# Patient Record
Sex: FEMALE | Race: WHITE
[De-identification: names, ages, dates, MRNs, and addresses within clinical notes are randomized per-mention and may not be internally consistent; named-entity substitution may affect disease eponyms.]

---

## 2017-09-16 ENCOUNTER — HOSPITAL ENCOUNTER (EMERGENCY)
Dept: HOSPITAL 25 - ED | Age: 72
Discharge: HOME | End: 2017-09-16
Payer: MEDICARE

## 2017-09-16 VITALS — DIASTOLIC BLOOD PRESSURE: 60 MMHG | SYSTOLIC BLOOD PRESSURE: 180 MMHG

## 2017-09-16 DIAGNOSIS — R53.83: Primary | ICD-10-CM

## 2017-09-16 DIAGNOSIS — M25.50: ICD-10-CM

## 2017-09-16 LAB
ALBUMIN SERPL BCG-MCNC: 4.2 G/DL (ref 3.2–5.2)
ALP SERPL-CCNC: 86 U/L (ref 34–104)
ALT SERPL W P-5'-P-CCNC: 28 U/L (ref 7–52)
ANION GAP SERPL CALC-SCNC: 6 MMOL/L (ref 2–11)
AST SERPL-CCNC: 21 U/L (ref 13–39)
BUN SERPL-MCNC: 29 MG/DL (ref 6–24)
BUN/CREAT SERPL: 34.5 (ref 8–20)
CALCIUM SERPL-MCNC: 9.5 MG/DL (ref 8.6–10.3)
CHLORIDE SERPL-SCNC: 104 MMOL/L (ref 101–111)
CK SERPL-CCNC: 87 U/L (ref 10–223)
GLOBULIN SER CALC-MCNC: 3.4 G/DL (ref 2–4)
GLUCOSE SERPL-MCNC: 132 MG/DL (ref 70–100)
HCO3 SERPL-SCNC: 25 MMOL/L (ref 22–32)
HCT VFR BLD AUTO: 41 % (ref 35–47)
HGB BLD-MCNC: 14 G/DL (ref 12–16)
MCH RBC QN AUTO: 31 PG (ref 27–31)
MCHC RBC AUTO-ENTMCNC: 34 G/DL (ref 31–36)
MCV RBC AUTO: 90 FL (ref 80–97)
POTASSIUM SERPL-SCNC: 3.9 MMOL/L (ref 3.5–5)
PROT SERPL-MCNC: 7.6 G/DL (ref 6.4–8.9)
RBC # BLD AUTO: 4.58 10^6/UL (ref 4–5.4)
SODIUM SERPL-SCNC: 135 MMOL/L (ref 133–145)
WBC # BLD AUTO: 11 10^3/UL (ref 3.5–10.8)

## 2017-09-16 PROCEDURE — 80053 COMPREHEN METABOLIC PANEL: CPT

## 2017-09-16 PROCEDURE — 99282 EMERGENCY DEPT VISIT SF MDM: CPT

## 2017-09-16 PROCEDURE — 85025 COMPLETE CBC W/AUTO DIFF WBC: CPT

## 2017-09-16 PROCEDURE — 36415 COLL VENOUS BLD VENIPUNCTURE: CPT

## 2017-09-16 PROCEDURE — 82550 ASSAY OF CK (CPK): CPT

## 2017-09-16 NOTE — ED
Complex/Multi-Sys Presentation





- HPI Summary


HPI Summary: 





Patient presents to the ED with multiple complaints.  She initiates the 

conversation with comments of how she needs to move out of her apartment 

complex and she has not been sleeping well d/t her loud neighbors.  She then 

discusses intermittent feeling of tingling sensation in her bilateral arms and 

legs which began several weeks ago without known injury.  She lifted a large 

box recently, but denied any back pain at that time.  She endorses some neck 

pain, but contributes this to not sleeping well and carrying extra weight.  She 

states she is 130lbs overweight and is requesting pain management through the 

hospital system.  She believes she may have diabetes and is currently trying to 

get ahold of her Dr (Dr. Purcell) to evaluate for this and her high cholesterol. 

She has high BP on arrival and is requesting a medication change from her 

generic propanolol to the name brand medication.  Patient denies chest pain, SOB

, weakness, abdominal pain, urinary symptoms, or visual changes.  She states 

since she has been carrying extra weight she has felt fatigued more easily.  VS 

stable except for elevated BP on arrival at 204/90.  





- History Of Current Complaint


Chief Complaint: EDNeckComplaint


Time Seen by Provider: 09/16/17 15:56


Hx Obtained From: Patient


Onset/Duration: Sudden Onset


Timing: Constant


Severity Currently: Mild


Severity Initially: Mild


Associated Signs And Symptoms: Negative: Decreased Responsiveness, Confusion, 

Agitation





- Allergies/Home Medications


Allergies/Adverse Reactions: 


 Allergies











Allergy/AdvReac Type Severity Reaction Status Date / Time


 


Celecoxib [From Celebrex] Allergy  Unknown Verified 03/28/16 14:23





   Reaction  





   Details  


 


Cephalexin [From Keflex] Allergy  Unknown Verified 03/28/16 14:23





   Reaction  





   Details  


 


Cimetidine [From Tagamet HB] Allergy  Rash Verified 03/28/16 14:23


 


Erythromycin Allergy  VERY SICK Verified 03/28/16 14:23





   TO STOMACH  


 


Trazodone Allergy  Unknown Verified 03/28/16 14:23





   Reaction  





   Details  


 


Ciprofloxacin AdvReac  Joint Pain Verified 03/28/16 14:23














PMH/Surg Hx/FS Hx/Imm Hx


Previously Healthy: Yes


Endocrine/Hematology History: 


   Denies: Hx Anticoagulant Therapy


Cardiovascular History: Reports: Hx Hypertension


   Denies: Hx Pacemaker/ICD


Musculoskeletal History: 


   Denies: Hx Osteoporosis


Sensory History: 


   Denies: Hx Hearing Aid


Psychiatric History: 


   Denies: Hx Panic Disorder, Hx of Violent Episodes Against Others





- Cancer History


Cancer Type, Location and Year: BASAL CELL CARCINOMA- SHOULDER AREA-MANY YRS 

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


Hx Chemotherapy: No


Hx Radiation Therapy: No





- Surgical History


Surgery Procedure, Year, and Place: IMPACTED WISDON TEETH; CYST REMOVED FROM 

OVARY AREA; APPENDECTOMY; LAPROSCOPY & THEN TOTAL HYSTERECTOMY; TONSILS; 

ENDOMETRIOUSIS; PHILLIP GREAT TOENAILS REMOVED; BASAL CELL REMOVAL FROM SHOULDER &.

  FACIAL-NEAR NOSE; CATARACT; CARDIAC CATH W/O STENTS





- Immunization History


Date of Tetanus Vaccine: Unk


Date of Influenza Vaccine: 2012; avoids due to getting flu-like symptoms after 

vaccination


Hx Pertussis Vaccination: No


Immunizations Up to Date: Unable to Obtain/Confirm


Infectious Disease History: No


Infectious Disease History: 


   Denies: Traveled Outside the [place] in Last 30 Days





- Family History


Known Family History: Positive: None





- Social History


Occupation: Unemployed


Alcohol Use: None


Substance Use Type: Reports: None


Smoking Status (MU): Never Smoked Tobacco





Review of Systems


Positive: Fatigue.  Negative: Fever, Chills


Negative: Photophobia, Blurred Vision


ENT: Negative


Cardiovascular: Negative


Negative: Chest Pain


Respiratory: Negative


Negative: Shortness Of Breath, Cough


Negative: Abdominal Pain, Diarrhea, Nausea


Positive: no symptoms reported, see HPI


Positive: Arthralgia - neck tenderness after lifting heavy object


Neurological: Negative


Negative: Weakness, Paresthesia


Psychological: Normal


All Other Systems Reviewed And Are Negative: Yes





Physical Exam


Triage Information Reviewed: Yes


Vital Signs On Initial Exam: 


 Initial Vitals











Temp Pulse Resp BP Pulse Ox


 


 98.2 F   98   18   204/90   97 


 


 09/16/17 15:48  09/16/17 15:48  09/16/17 15:48  09/16/17 15:48  09/16/17 15:48











Vital Signs Reviewed: Yes


Appearance: Positive: Well-Appearing, Well-Nourished


Skin: Positive: Warm, Skin Color Reflects Adequate Perfusion


Head/Face: Positive: Normal Head/Face Inspection


Eyes: Positive: EOMI, BETZY, Conjunctiva Clear


Neck: Positive: Supple, No Lymphadenopathy


Respiratory/Lung Sounds: Positive: Clear to Auscultation, Breath Sounds Present


Cardiovascular: Positive: Normal, RRR, Pulses are Symmetrical in both Upper and 

Lower Extremities


Musculoskeletal: Positive: Strength/ROM Intact


Neurological: Positive: Speech Normal


Psychiatric: Positive: Normal





- Narinder Coma Scale


Coma Scale Total: 15





Diagnostics





- Vital Signs


 Vital Signs











  Temp Pulse Resp BP Pulse Ox


 


 09/16/17 15:48  98.2 F  98  18  204/90  97














- Laboratory


Lab Results: 


 Lab Results











  09/16/17 09/16/17 Range/Units





  16:40 16:40 


 


WBC  11.0 H   (3.5-10.8)  10^3/ul


 


RBC  4.58   (4.0-5.4)  10^6/ul


 


Hgb  14.0   (12.0-16.0)  g/dl


 


Hct  41   (35-47)  %


 


MCV  90   (80-97)  fL


 


MCH  31   (27-31)  pg


 


MCHC  34   (31-36)  g/dl


 


RDW  14   (10.5-15)  %


 


Plt Count  226   (150-450)  10^3/ul


 


MPV  9   (7.4-10.4)  um3


 


Neut % (Auto)  59.9   (38-83)  %


 


Lymph % (Auto)  29.4   (25-47)  %


 


Mono % (Auto)  8.6   (1-9)  %


 


Eos % (Auto)  1.3   (0-6)  %


 


Baso % (Auto)  0.8   (0-2)  %


 


Absolute Neuts (auto)  6.6   (1.5-7.7)  10^3/ul


 


Absolute Lymphs (auto)  3.2   (1.0-4.8)  10^3/ul


 


Absolute Monos (auto)  0.9 H   (0-0.8)  10^3/ul


 


Absolute Eos (auto)  0.1   (0-0.6)  10^3/ul


 


Absolute Basos (auto)  0.1   (0-0.2)  10^3/ul


 


Absolute Nucleated RBC  0   10^3/ul


 


Nucleated RBC %  0   


 


Sodium   135  (133-145)  mmol/L


 


Potassium   3.9  (3.5-5.0)  mmol/L


 


Chloride   104  (101-111)  mmol/L


 


Carbon Dioxide   25  (22-32)  mmol/L


 


Anion Gap   6  (2-11)  mmol/L


 


BUN   29 H  (6-24)  mg/dL


 


Creatinine   0.84  (0.51-0.95)  mg/dL


 


Est GFR ( Amer)   86.2  (>60)  


 


Est GFR (Non-Af Amer)   67.0  (>60)  


 


BUN/Creatinine Ratio   34.5 H  (8-20)  


 


Glucose   132 H  ()  mg/dL


 


Calcium   9.5  (8.6-10.3)  mg/dL


 


Total Bilirubin   0.30  (0.2-1.0)  mg/dL


 


AST   21  (13-39)  U/L


 


ALT   28  (7-52)  U/L


 


Alkaline Phosphatase   86  ()  U/L


 


Total Creatine Kinase   87  ()  U/L


 


Total Protein   7.6  (6.4-8.9)  g/dL


 


Albumin   4.2  (3.2-5.2)  g/dL


 


Globulin   3.4  (2-4)  g/dL


 


Albumin/Globulin Ratio   1.2  (1-3)  











Result Diagrams: 


 09/16/17 16:40





 09/16/17 16:40


Lab Statement: Any lab studies that have been ordered have been reviewed, and 

results considered in the medical decision making process.





Complex Multi-Symp Course/Dx


Course Of Treatment: Discussed at [facility] the issues for which she comes to the 

ED for.  She is encouraged to follow up with PCP as these issues should all be 

dealt with on an outpatient basis.  She is encouraged to follow up for possible 

BP medications and diabetes check up.  Nothing noted on blood work at this 

time.  She is given information for weight loss program through [employer] 

as requested.  On discharge, her BP is at 180/70 and there is no evidence of 

organ damage, so will defer at this time for adding of medications.  Patient is 

made aware and she is OK with plan to follow up with Dr. Purcell.





- Diagnoses


Differential Diagnoses/HQI/PQRI: Metabolic Abnormality, Other - insomnia, HTN


Provider Diagnoses: 


 Fatigue








Discharge





- Discharge Plan


Condition: Stable


Disposition: HOME


Patient Education Materials:  Chronic Hypertension (ED), Weight Management (ED)


Referrals: 


Chau Purcell MD [Primary Care Provider] - 


Additional Instructions: 


Follow up with Dr. Purcell as discussed for hypertension





Try relaxation techniques and speak with your .





Try to rest and recover from your episodes of not sleeping well

## 2018-05-19 ENCOUNTER — HOSPITAL ENCOUNTER (EMERGENCY)
Dept: HOSPITAL 25 - ED | Age: 73
Discharge: HOME | End: 2018-05-19
Payer: MEDICARE

## 2018-05-19 VITALS — SYSTOLIC BLOOD PRESSURE: 189 MMHG | DIASTOLIC BLOOD PRESSURE: 97 MMHG

## 2018-05-19 DIAGNOSIS — K02.9: Primary | ICD-10-CM

## 2018-05-19 DIAGNOSIS — Z88.8: ICD-10-CM

## 2018-05-19 DIAGNOSIS — Z88.3: ICD-10-CM

## 2018-05-19 PROCEDURE — 99282 EMERGENCY DEPT VISIT SF MDM: CPT

## 2019-02-17 ENCOUNTER — HOSPITAL ENCOUNTER (EMERGENCY)
Dept: HOSPITAL 25 - ED | Age: 74
Discharge: ANOTHER HOSPITAL | End: 2019-02-17
Payer: MEDICARE

## 2019-02-17 VITALS — SYSTOLIC BLOOD PRESSURE: 202 MMHG | DIASTOLIC BLOOD PRESSURE: 95 MMHG

## 2019-02-17 DIAGNOSIS — E11.65: ICD-10-CM

## 2019-02-17 DIAGNOSIS — Z79.82: ICD-10-CM

## 2019-02-17 DIAGNOSIS — I10: ICD-10-CM

## 2019-02-17 DIAGNOSIS — K92.2: Primary | ICD-10-CM

## 2019-02-17 DIAGNOSIS — R21: ICD-10-CM

## 2019-02-17 DIAGNOSIS — K76.0: ICD-10-CM

## 2019-02-17 DIAGNOSIS — Z85.820: ICD-10-CM

## 2019-02-17 LAB
ALBUMIN SERPL BCG-MCNC: 4.1 G/DL (ref 3.2–5.2)
ALBUMIN/GLOB SERPL: 1.3 {RATIO} (ref 1–3)
ALP SERPL-CCNC: 83 U/L (ref 34–104)
ALT SERPL W P-5'-P-CCNC: 36 U/L (ref 7–52)
ANION GAP SERPL CALC-SCNC: 8 MMOL/L (ref 2–11)
APTT PPP: 29.1 SECONDS (ref 26–36.3)
AST SERPL-CCNC: 24 U/L (ref 13–39)
BASOPHILS # BLD AUTO: 0.1 10^3/UL (ref 0–0.2)
BUN SERPL-MCNC: 25 MG/DL (ref 6–24)
BUN/CREAT SERPL: 34.2 (ref 8–20)
CALCIUM SERPL-MCNC: 9.6 MG/DL (ref 8.6–10.3)
CHLORIDE SERPL-SCNC: 103 MMOL/L (ref 101–111)
EOSINOPHIL # BLD AUTO: 0.1 10^3/UL (ref 0–0.6)
GLOBULIN SER CALC-MCNC: 3.2 G/DL (ref 2–4)
GLUCOSE SERPL-MCNC: 178 MG/DL (ref 70–100)
HCO3 SERPL-SCNC: 25 MMOL/L (ref 22–32)
HCT VFR BLD AUTO: 42 % (ref 35–47)
HGB BLD-MCNC: 14.1 G/DL (ref 12–16)
INR PPP/BLD: 0.94 (ref 0.77–1.02)
LYMPHOCYTES # BLD AUTO: 2.1 10^3/UL (ref 1–4.8)
MCH RBC QN AUTO: 30 PG (ref 27–31)
MCHC RBC AUTO-ENTMCNC: 34 G/DL (ref 31–36)
MCV RBC AUTO: 90 FL (ref 80–97)
MONOCYTES # BLD AUTO: 0.9 10^3/UL (ref 0–0.8)
NEUTROPHILS # BLD AUTO: 5 10^3/UL (ref 1.5–7.7)
NRBC # BLD AUTO: 0 10^3/UL
NRBC BLD QL AUTO: 0
PLATELET # BLD AUTO: 203 10^3/UL (ref 150–450)
POTASSIUM SERPL-SCNC: 4 MMOL/L (ref 3.5–5)
PROT SERPL-MCNC: 7.3 G/DL (ref 6.4–8.9)
RBC # BLD AUTO: 4.65 10^6/UL (ref 4–5.4)
SODIUM SERPL-SCNC: 136 MMOL/L (ref 135–145)
TROPONIN I SERPL-MCNC: 0 NG/ML (ref ?–0.04)
WBC # BLD AUTO: 8.1 10^3/UL (ref 3.5–10.8)

## 2019-02-17 PROCEDURE — 84484 ASSAY OF TROPONIN QUANT: CPT

## 2019-02-17 PROCEDURE — 83605 ASSAY OF LACTIC ACID: CPT

## 2019-02-17 PROCEDURE — 96360 HYDRATION IV INFUSION INIT: CPT

## 2019-02-17 PROCEDURE — 93005 ELECTROCARDIOGRAM TRACING: CPT

## 2019-02-17 PROCEDURE — 86900 BLOOD TYPING SEROLOGIC ABO: CPT

## 2019-02-17 PROCEDURE — 82272 OCCULT BLD FECES 1-3 TESTS: CPT

## 2019-02-17 PROCEDURE — 99284 EMERGENCY DEPT VISIT MOD MDM: CPT

## 2019-02-17 PROCEDURE — 71045 X-RAY EXAM CHEST 1 VIEW: CPT

## 2019-02-17 PROCEDURE — 80053 COMPREHEN METABOLIC PANEL: CPT

## 2019-02-17 PROCEDURE — 85730 THROMBOPLASTIN TIME PARTIAL: CPT

## 2019-02-17 PROCEDURE — 74177 CT ABD & PELVIS W/CONTRAST: CPT

## 2019-02-17 PROCEDURE — 36415 COLL VENOUS BLD VENIPUNCTURE: CPT

## 2019-02-17 PROCEDURE — 86901 BLOOD TYPING SEROLOGIC RH(D): CPT

## 2019-02-17 PROCEDURE — 86850 RBC ANTIBODY SCREEN: CPT

## 2019-02-17 PROCEDURE — 85025 COMPLETE CBC W/AUTO DIFF WBC: CPT

## 2019-02-17 PROCEDURE — 85610 PROTHROMBIN TIME: CPT

## 2019-02-17 PROCEDURE — 96361 HYDRATE IV INFUSION ADD-ON: CPT

## 2019-02-17 NOTE — ED
GI/ HPI





- HPI Summary


HPI Summary: 


73 year old F brought in by ambulance to [facility] with a chief complaint of bloody 

stools since 09:00 today. The patient rates the pain 0/10 in severity. Symptoms 

aggravated by nothing. Symptoms alleviated by nothing. Patient reports 

lightheadedness. She additionally complains of abdominal pain that waxes and 

wanes, rated 2/10. Patient notes "acid reflux" a few days ago. Patient denies 

fever, chest pain, nausea, vomiting. 





Patient was seen by her OBGYN 2 weeks ago for yeast infection. She was given 

antibiotic cream. Since then, patient still has irritation around clitoris. She 

was going to make an appointment tomorrow but noticed 5-cm blood clot in bowel 

movement this morning. 





Patient has hx erosive esophagitis dx upper endoscopy, Dr. Banks in 2013.  Pt 

is not currently on PPI or H-2 blocker. Patient's abdominal surgeries include 

appendectomy, total hysterectomy. She thinks she had a colonoscopy but can't 

remember when. Colonoscopy encounter not in [facility] records. Patient does not have 

Fhx colon CA.





Vital signs while in room: HR 77 bpm, /111.





 Home Medications











 Medication  Instructions  Recorded  Confirmed  Type


 


Aspirin [Adult Aspirin] 81 mg PO DAILY 02/17/19 02/17/19 History


 


Betamethasone Malini 0.1% CM(NF) 1 applic TOPICAL DAILY 02/17/19 02/17/19 History





[Valisone 0.1% CM(NF)]    


 


Biotin 5,000 mcg PO BID 02/17/19 02/17/19 History


 


Calcium Carbonate/Vitamin D3 1 cap PO DAILY 02/17/19 02/17/19 History





[Calcium/Vitamin D]    


 


Estradiol VAGINAL TAB(NF) [Vagifem] 10 mcg VAGINAL SEE INSTRUCTIONS 02/17/19 02/ 17/19 History


 


Ferrous Sulfate [Iron High-Potency] 325 mg PO EVERY OTHER DAY 02/17/19 02/17/19 

History


 


Flaxseed 340 gm PO DAILY 02/17/19 02/17/19 History


 


Labetalol HCl 100 mg PO DAILY 02/17/19 02/17/19 History


 


Lidocaine PATCH 5%* [Lidoderm 5% 1 patch TRANSDERM DAILY PRN 02/17/19 02/17/19 

History





Patch*]    


 


Magnesium Oxide [Magnesium] 500 mg PO DAILY 02/17/19 02/17/19 History


 


Melatonin/Pyridoxine HCl (B6) 1 tab PO BEDTIME 02/17/19 02/17/19 History





[Melatonin]    


 


Miconazole VAG SUPP* 200 mg VAGINAL BEDTIME PRN 02/17/19 02/17/19 History


 


Niacin 500 mg PO DAILY 02/17/19 02/17/19 History


 


Selenium 200 mcg PO DAILY 02/17/19 02/17/19 History


 


Triamcinolone PASTE 0.1% (NF) 1 applic TOPICAL DAILY 02/17/19 02/17/19 History





[Triamcinolone 0.1% PASTE *]    


 


Zinc 50 mg PO DAILY 02/17/19 02/17/19 History


 


diazePAM [Diazepam] 5 mg PO DAILY PRN 02/17/19 02/17/19 History


 


diphenhydrAMINE HCl 25 mg PO DAILY PRN 02/17/19 02/17/19 History





[Diphenhydramine HCl]    














- History of Current Complaint


Chief Complaint: EDGIBleed


Time Seen by Provider: 02/17/19 11:51


Stated Complaint: BLOODY STOOL


Hx Obtained From: Patient, EMS


Onset/Duration: Started Hours Ago - 09:00 today, Still Present


Timing: Constant


Severity: Moderate


Current Severity: None - no abd pain at initial encounter


Pain Intensity: 0


Pain Characteristics: Dull


Associated Signs and Symptoms: Positive: Negative - fever, chest pain, nausea, 

vomiting, Blood w/Stool - jacqueline stool, Other: - lightheadedness, abdominal 

pain, "acid reflux"


Aggravating Factor(s): Nothing


Alleviating Factor(s): Nothing





- Allergy/Home Medications


Allergies/Adverse Reactions: 


 Allergies











Allergy/AdvReac Type Severity Reaction Status Date / Time


 


cimetidine Allergy  Unknown Verified 02/17/19 15:42





   Reaction  





   Details  


 


ciprofloxacin Allergy  Joint Pain Verified 02/17/19 15:42


 


erythromycin base Allergy  Rash Verified 02/17/19 15:42


 


trazodone Allergy  Unknown Verified 02/17/19 15:42





   Reaction  





   Details  











Home Medications: 


 Home Medications





Aspirin [Adult Aspirin] 81 mg PO DAILY 02/17/19 [History Confirmed 02/17/19]


Betamethasone Malini 0.1% CM(NF) [Valisone 0.1% CM(NF)] 1 applic TOPICAL DAILY 02/ 17/19 [History Confirmed 02/17/19]


Biotin 5,000 mcg PO BID 02/17/19 [History Confirmed 02/17/19]


Calcium Carbonate/Vitamin D3 [Calcium/Vitamin D] 1 cap PO DAILY 02/17/19 [

History Confirmed 02/17/19]


Estradiol VAGINAL TAB(NF) [Vagifem] 10 mcg VAGINAL SEE INSTRUCTIONS 02/17/19 [

History Confirmed 02/17/19]


Ferrous Sulfate [Iron High-Potency] 325 mg PO EVERY OTHER DAY 02/17/19 [History 

Confirmed 02/17/19]


Flaxseed 340 gm PO DAILY 02/17/19 [History Confirmed 02/17/19]


Labetalol HCl 100 mg PO DAILY 02/17/19 [History Confirmed 02/17/19]


Lidocaine PATCH 5%* [Lidoderm 5% Patch*] 1 patch TRANSDERM DAILY PRN 02/17/19 [

History Confirmed 02/17/19]


Magnesium Oxide [Magnesium] 500 mg PO DAILY 02/17/19 [History Confirmed 02/17/19

]


Melatonin/Pyridoxine HCl (B6) [Melatonin] 1 tab PO BEDTIME 02/17/19 [History 

Confirmed 02/17/19]


Miconazole VAG SUPP* 200 mg VAGINAL BEDTIME PRN 02/17/19 [History Confirmed 02/ 17/19]


Niacin 500 mg PO DAILY 02/17/19 [History Confirmed 02/17/19]


Selenium 200 mcg PO DAILY 02/17/19 [History Confirmed 02/17/19]


Triamcinolone PASTE 0.1% (NF) [Triamcinolone 0.1% PASTE *] 1 applic TOPICAL 

DAILY 02/17/19 [History Confirmed 02/17/19]


Zinc 50 mg PO DAILY 02/17/19 [History Confirmed 02/17/19]


diazePAM [Diazepam] 5 mg PO DAILY PRN 02/17/19 [History Confirmed 02/17/19]


diphenhydrAMINE HCl [Diphenhydramine HCl] 25 mg PO DAILY PRN 02/17/19 [History 

Confirmed 02/17/19]











PMH/Surg Hx/FS Hx/Imm Hx


Previously Healthy: No


Endocrine/Hematology History: Reports: Hx Diabetes


   Denies: Hx Anticoagulant Therapy


Cardiovascular History: Reports: Hx Hypertension


   Denies: Hx Pacemaker/ICD


GI History: Reports: Other GI Disorders - erosive esophagitis 


 History: Reports: Other  Problems/Disorders - recent treatment for "

vaginal yeast infection" No vaginal bleeding 


Musculoskeletal History: 


   Denies: Hx Osteoporosis


Sensory History: 


   Denies: Hx Hearing Aid


Psychiatric History: 


   Denies: Hx Panic Disorder, Hx of Violent Episodes Against Others





- Cancer History


Cancer Type, Location and Year: BASAL CELL CARCINOMA- SHOULDER AREA-MANY YRS 

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


Hx Chemotherapy: No


Hx Radiation Therapy: No





- Surgical History


Surgery Procedure, Year, and Place: IMPACTED WISDON TEETH; CYST REMOVED FROM 

OVARY AREA; APPENDECTOMY; LAPROSCOPY & THEN TOTAL HYSTERECTOMY; TONSILS; 

ENDOMETRIOSIS; PHILLIP GREAT TOENAILS REMOVED; BASAL CELL REMOVAL FROM SHOULDER &.  

FACIAL-NEAR NOSE; CATARACT; CARDIAC CATH W/O STENTS





- Immunization History


Date of Tetanus Vaccine: Unk


Date of Influenza Vaccine: 2012; avoids due to getting flu-like symptoms after 

vaccination


Infectious Disease History: No


Infectious Disease History: 


   Denies: Traveled Outside the [place] in Last 30 Days





- Family History


Known Family History: Positive: Cardiac Disease


   Negative: Diabetes





- Social History


Lives: Alone


Alcohol Use: None


Hx Substance Use: No


Substance Use Type: Reports: None


Hx Tobacco Use: No


Smoking Status (MU): Never Smoked Tobacco





Review of Systems


Negative: Fever


Negative: Chest Pain


Respiratory: Negative


Positive: Abdominal Pain, Other - acid reflux.  Negative: Vomiting, Nausea


Positive: no symptoms reported.  Negative: discharge


Musculoskeletal: Negative


Positive: Rash - left medial thigh 


Neurological: Other - lightheadedness


Psychological: Normal


All Other Systems Reviewed And Are Negative: Yes





Physical Exam





- Summary


Physical Exam Summary: 


Appearance: Ill-appearing, moderate pain distress, obese 


Skin: 10-cm x 12-cm rash on medial thigh that is red, has raised edges and 

central clearing 


Head: Normal Head/Face inspection, atraumatic


Eyes: Conjunctiva clear


ENT: Normal inspection


Neck: Supple, no nodes, no JVD


Respiratory: Lungs clear, normal breath sounds, no respiratory distress


Cardio: RRR, No murmur, pulses normal, brisk capillary refill


Abdomen: Soft, nontender. Patient has right low quadrant scar from pubis to 

umbilicus and a midline scar from umbilicus to pubis.


Bowel sounds: Present 


Musculoskeletal: Strength Intact/ROM intact, no calf tenderness, no edema.


Psychological: Normal


Neuro: Alert, muscle tone normal, no focal deficit


Rectal exam chaperoned by nurse Quintanilla: Patient has small external hemorrhoids 

that are non-thrombosed and not bleeding.  I feel some soft stool. There are no 

masses. This is a non-tender exam. Patient has jacqueline stool.


Triage Information Reviewed: Yes


Vital Signs On Initial Exam: 


 Initial Vitals











Temp Pulse Resp BP Pulse Ox


 


 98.3 F   87   18   220/111   98 


 


 02/17/19 11:47  02/17/19 11:47  02/17/19 11:47  02/17/19 11:47  02/17/19 11:47











Vital Signs Reviewed: Yes





Diagnostics





- Vital Signs


 Vital Signs











  Temp Pulse Resp BP Pulse Ox


 


 02/17/19 11:47  98.3 F  87  18  220/111  98














- Laboratory


Result Diagrams: 


 02/17/19 12:15





 02/17/19 12:15


Lab Statement: Any lab studies that have been ordered have been reviewed, and 

results considered in the medical decision making process.





- Radiology


  ** CXR


Radiology Interpretation Completed By: Radiologist


Summary of Radiographic Findings: NO ACTIVE CARDIOPULMONARY DISEASE. ED 

physician has reviewed this report.





- CT


  ** Abd/Pel


CT Interpretation Completed By: Radiologist


Summary of CT Findings: FATTY INFILTRATION OF THE LIVER. NO ACUTE CT PATHOLOGY 

OF THE VISUALIZED ABDOMEN OR PELVIS. ED physician has reviewed this report.





- EKG


  ** 1223


Cardiac Rate: NL - 74 BPM


EKG Rhythm: Sinus Rhythm


ST Segment: Non-Specific


Ectopy: None


EKG Comparison: No Significant Change - Compared to 11/17/16


Summary of EKG Findings: Nml AV/IV CT, nml QTc, and left axis. No acute changes





Re-Evaluation





- Re-Evaluation


  ** First Eval


Re-Evaluation Time: 13:04


Change: Unchanged


Comment: Discussed transfer plan with patient who is agreeable. HR 73 BPM, BP 

166/101





GIGU Course/Dx





- Course


Course Of Treatment: Reviewed nurses note. Patient medications reviewed this 

visit. Allergies noted. High blood pressure noted. Bloodwork was remarkable for 

glucose 178. Patient's initial H/H is 14.1/42. Stool sample is positive for 

blood in stool. CXR shows NO ACTIVE CARDIOPULMONARY DISEASE. CT Abd/Pel shows 

FATTY INFILTRATION OF THE LIVER. NO ACUTE CT PATHOLOGY OF THE VISUALIZED 

ABDOMEN OR PELVIS. Patient was given IV fluids in ED course. Spoke with Dr. Ruiz

, hospitalist, who recommends transferring patient due to lack of GI coverage 

today. Transfer initiated at 13:56. At 14:04, spoke with Dr. Leos, ED 

physician, at [facility] in [place], [place], who referred us to Dr. Joe Leger, 

hospitalist, at [facility], who agreed to accept patient at 14:14. Spoke with 

Aaliyah from [facility] at 14:15 who will call back with a bed 

assignment for patient. Spoke with Avelino from [facility] at 14:17 who 

is aware of the situation. At 15:18, [facility] returned call 

without a bed assignment. Spoke with Alize from [facility] at 15:32, 

who connected us to [facility] who said she did not have an 

estimate of when bed availibilty would be ready. At 15:50, [facility] 

[facility] was called. Spoke with Bridget at 15:54 who said that it might be tough 

to get a bed at [facility] but said she would speak to her nursing supervisor, 

Rashida. At 15:59, we are waiting for someone to call us back with a bed 

assignment. At 16:15, [facility] called us back with a bed 

assignment. Patient was accepted to [facility], Room 406. Patient will be 

transferred to [facility] in [place], [place]. Patient is agreeable to transfer.





- Diagnoses


Differential Diagnoses - Female: Colitis, Diverticulosis, Enterocolitis, 

Esophagitis/Gastritis, Ischemic Bowel


Provider Diagnoses: 


 GI bleed, Hypertension, poor control, Rash, Hyperglycemia








- Physician Notifications


Discussed Care Of Patient With: Herson Ruiz


Time Discussed With Above Provider: 13:02


Instructed by Provider To: Transfer - Dr. Ruiz, hospitalist, recommends 

transferring the patient to a facility that has GI coverage.


Reason For Transfer: Specialty available at [facility] but not on call.





- Critical Care Time


Critical Care Time: 30-74 min - 30 mins





Discharge





- Sign-Out/Discharge


Documenting (check all that apply): Patient Departure - Transfer


Patient Received Moderate/Deep Sedation with Procedure: No





- Discharge Plan


Condition: Stable


Disposition: TRANS HIGHER LVL OF CARE FAC


Referrals: 


Chau Purcell MD [Primary Care Provider] - 


Additional Instructions: 


Return to the emergency department for new or worsening symptoms





- Billing Disposition and Condition


Condition: STABLE


Disposition: Trans Higher Lvl of Care Fac





- Attestation Statements


Document Initiated by Scribe: Yes


Documenting Scribe: Haylie Schulte


Provider For Whom Scribe is Documenting (Include Credential): Tammy Boswell MD


Scribe Attestation: 


Haylie TORRES, scribed for Tammy Boswell MD on 02/17/19 at 1634. 


Scribe Documentation Reviewed: Yes


Provider Attestation: 


The documentation as recorded by the scribe, Haylie Schulte accurately reflects 

the service I personally performed and the decisions made by me, Tammy Boswell MD


Status of Scribe Document: Viewed

## 2019-02-25 ENCOUNTER — HOSPITAL ENCOUNTER (EMERGENCY)
Dept: HOSPITAL 25 - ED | Age: 74
Discharge: HOME | End: 2019-02-25
Payer: MEDICARE

## 2019-02-25 VITALS — SYSTOLIC BLOOD PRESSURE: 145 MMHG | DIASTOLIC BLOOD PRESSURE: 92 MMHG

## 2019-02-25 DIAGNOSIS — N39.0: Primary | ICD-10-CM

## 2019-02-25 DIAGNOSIS — R31.9: ICD-10-CM

## 2019-02-25 DIAGNOSIS — I10: ICD-10-CM

## 2019-02-25 DIAGNOSIS — E11.9: ICD-10-CM

## 2019-02-25 DIAGNOSIS — R10.9: ICD-10-CM

## 2019-02-25 LAB
ALBUMIN SERPL BCG-MCNC: 4.2 G/DL (ref 3.2–5.2)
ALBUMIN/GLOB SERPL: 1.4 {RATIO} (ref 1–3)
ALP SERPL-CCNC: 82 U/L (ref 34–104)
ALT SERPL W P-5'-P-CCNC: 36 U/L (ref 7–52)
ANION GAP SERPL CALC-SCNC: 8 MMOL/L (ref 2–11)
AST SERPL-CCNC: 24 U/L (ref 13–39)
BASOPHILS # BLD AUTO: 0.1 10^3/UL (ref 0–0.2)
BUN SERPL-MCNC: 20 MG/DL (ref 6–24)
BUN/CREAT SERPL: 27.8 (ref 8–20)
CALCIUM SERPL-MCNC: 9.3 MG/DL (ref 8.6–10.3)
CHLORIDE SERPL-SCNC: 104 MMOL/L (ref 101–111)
EOSINOPHIL # BLD AUTO: 0.1 10^3/UL (ref 0–0.6)
GLOBULIN SER CALC-MCNC: 3 G/DL (ref 2–4)
GLUCOSE SERPL-MCNC: 101 MG/DL (ref 70–100)
HCO3 SERPL-SCNC: 25 MMOL/L (ref 22–32)
HCT VFR BLD AUTO: 41 % (ref 35–47)
HGB BLD-MCNC: 14.1 G/DL (ref 12–16)
LYMPHOCYTES # BLD AUTO: 2.6 10^3/UL (ref 1–4.8)
MCH RBC QN AUTO: 31 PG (ref 27–31)
MCHC RBC AUTO-ENTMCNC: 34 G/DL (ref 31–36)
MCV RBC AUTO: 90 FL (ref 80–97)
MONOCYTES # BLD AUTO: 1 10^3/UL (ref 0–0.8)
NEUTROPHILS # BLD AUTO: 5.1 10^3/UL (ref 1.5–7.7)
NRBC # BLD AUTO: 0 10^3/UL
NRBC BLD QL AUTO: 0
PLATELET # BLD AUTO: 209 10^3/UL (ref 150–450)
POTASSIUM SERPL-SCNC: 4.3 MMOL/L (ref 3.5–5)
PROT SERPL-MCNC: 7.2 G/DL (ref 6.4–8.9)
RBC # BLD AUTO: 4.61 10^6/UL (ref 4–5.4)
RBC UR QL AUTO: (no result)
SODIUM SERPL-SCNC: 137 MMOL/L (ref 135–145)
WBC # BLD AUTO: 8.8 10^3/UL (ref 3.5–10.8)
WBC UR QL AUTO: (no result)

## 2019-02-25 PROCEDURE — 99283 EMERGENCY DEPT VISIT LOW MDM: CPT

## 2019-02-25 PROCEDURE — 80053 COMPREHEN METABOLIC PANEL: CPT

## 2019-02-25 PROCEDURE — 85025 COMPLETE CBC W/AUTO DIFF WBC: CPT

## 2019-02-25 PROCEDURE — 83690 ASSAY OF LIPASE: CPT

## 2019-02-25 PROCEDURE — 36415 COLL VENOUS BLD VENIPUNCTURE: CPT

## 2019-02-25 PROCEDURE — 87086 URINE CULTURE/COLONY COUNT: CPT

## 2019-02-25 PROCEDURE — 81003 URINALYSIS AUTO W/O SCOPE: CPT

## 2019-02-25 PROCEDURE — 81015 MICROSCOPIC EXAM OF URINE: CPT

## 2019-02-25 NOTE — ED
GI/ HPI





- HPI Summary


HPI Summary: 





Pt is a 72 y/o F presenting to the ED with a chief complaint of bleeding upon 

urination with associated RLQ pain. She states she has had an odor and itch for 

some time, which ended up being an infection that she treated with an Rx from 

her ob/gyn, but she thinks it may  be coming back. She has had scans done for 

her lower GI which turned out fine. Today, she saw bright red blood after 

urinating.





- History of Current Complaint


Chief Complaint: EDUrogenitalProblems


Time Seen by Provider: 02/25/19 12:47


Stated Complaint: GENERAL


Hx Obtained From: Patient


Onset/Duration: Started Days Ago, Still Present


Timing: Constant, Lasting Days


Severity: Moderate


Current Severity: Moderate


Vaginal Bleeding Description: Bright Red


Pain Intensity: 4


Location of Pain: RLQ


Pain Characteristics: Sharp


Associated Signs and Symptoms: Positive: Hematuria, Abdominal Pain, UTI Symptoms





- Allergy/Home Medications


Allergies/Adverse Reactions: 


 Allergies











Allergy/AdvReac Type Severity Reaction Status Date / Time


 


celecoxib Allergy  Hives Verified 02/25/19 12:22


 


cephalexin [From Keflex] Allergy  Unknown Verified 02/25/19 12:22





   Reaction  





   Details  


 


cimetidine Allergy  Unknown Verified 02/25/19 12:22





   Reaction  





   Details  


 


ciprofloxacin Allergy  Joint Pain Verified 02/25/19 12:22


 


erythromycin base Allergy  Rash Verified 02/25/19 12:22


 


hydroxyzine Allergy  Headache Verified 02/25/19 12:22


 


omeprazole Allergy  Unknown Verified 02/25/19 12:22





   Reaction  





   Details  


 


trazodone Allergy  Unknown Verified 02/25/19 12:22





   Reaction  





   Details  











Home Medications: 


 Home Medications





Ascorbic Acid/Collagen Hydr [Collagen Plus Vitamin C] 1 cap PO DAILY 02/25/19 [

History Confirmed 02/25/19]


Aspirin EC TAB* [Ecotrin EC Low Dose 81 MG*] 81 mg PO DAILY 02/25/19 [History 

Confirmed 02/25/19]


Calcium Carbonate [Calcium] 1,200 mg PO DAILY 02/25/19 [History Confirmed 02/25/ 19]


Chrm/Vineg/Bit-Orang Peel/Gr T [Apple Cider Vinegar Plus] 1 tab PO DAILY 02/25/ 19 [History Confirmed 02/25/19]


Diazepam TAB(*) [Valium TAB(*)] 5 mg PO BID PRN MDD 10 mg 02/25/19 [History 

Confirmed 02/25/19]


Estradiol VAGINAL TAB(NF) [Vagifem] 10 mcg VAGINAL DAILY 02/25/19 [History 

Confirmed 02/25/19]


Flaxseed [Flax Seeds] 2 pow PO DAILY 02/25/19 [History Confirmed 02/25/19]


Folic Acid 400 mcg PO DAILY 02/25/19 [History Confirmed 02/25/19]


Hydrocortisone SUPP* [Anusol HC Supp*] 25 mg IN BID PRN 02/25/19 [History 

Confirmed 02/25/19]


Labetalol TAB* [Trandate TAB*] 100 mg PO DAILY 02/25/19 [History Confirmed 02/25 /19]


Melatonin 5 mg PO BEDTIME 02/25/19 [History Confirmed 02/25/19]


Miconazole Nitrate 2 % VAGINAL DAILY 02/25/19 [History Confirmed 02/25/19]


Niacinamide [Niacin] 500 mg PO DAILY 02/25/19 [History Confirmed 02/25/19]


Omega-3 Fatty Acids (Nf) [Fish Oil (NF)] 1,000 mg PO DAILY 02/25/19 [History 

Confirmed 02/25/19]


Oregano Oil [Oil of Oregano] 3,000 mg PO DAILY 02/25/19 [History Confirmed 02/25 /19]


Sennosides/Docusate Sodium [Senokot S] 2 tab PO DAILY 02/25/19 [History 

Confirmed 02/25/19]


diPHENhydraMINE PO* [Benadryl PO 25 MG TAB*] 25 mg PO Q6H PRN 02/25/19 [History 

Confirmed 02/25/19]











PMH/Surg Hx/FS Hx/Imm Hx


Previously Healthy: Yes


Endocrine/Hematology History: Reports: Hx Diabetes


   Denies: Hx Anticoagulant Therapy


Cardiovascular History: Reports: Hx Hypertension


   Denies: Hx Pacemaker/ICD


GI History: Reports: Other GI Disorders - erosive esophagitis 


 History: Reports: Other  Problems/Disorders - recent treatment for "

vaginal yeast infection" No vaginal bleeding 


Musculoskeletal History: 


   Denies: Hx Osteoporosis


Sensory History: 


   Denies: Hx Hearing Aid


Psychiatric History: 


   Denies: Hx Panic Disorder, Hx of Violent Episodes Against Others





- Cancer History


Cancer Type, Location and Year: BASAL CELL CARCINOMA- SHOULDER AREA-MANY YRS 

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


Hx Chemotherapy: No


Hx Radiation Therapy: No





- Surgical History


Surgery Procedure, Year, and Place: IMPACTED WISDON TEETH; CYST REMOVED FROM 

OVARY AREA; APPENDECTOMY; LAPROSCOPY & THEN TOTAL HYSTERECTOMY; TONSILS; 

ENDOMETRIOSIS; PHILLIP GREAT TOENAILS REMOVED; BASAL CELL REMOVAL FROM SHOULDER &.  

FACIAL-NEAR NOSE; CATARACT; CARDIAC CATH W/O STENTS





- Immunization History


Date of Tetanus Vaccine: Unk


Date of Influenza Vaccine: 2012; avoids due to getting flu-like symptoms after 

vaccination


Infectious Disease History: No


Infectious Disease History: 


   Denies: Traveled Outside the [place] in Last 30 Days





- Family History


Known Family History: Positive: Cardiac Disease


   Negative: Diabetes





- Social History


Alcohol Use: None


Hx Substance Use: No


Substance Use Type: Reports: None


Hx Tobacco Use: No


Smoking Status (MU): Never Smoked Tobacco





Review of Systems


Negative: Fever


Positive: Abdominal Pain


Positive: hematuria, other - odor, itch


All Other Systems Reviewed And Are Negative: Yes





Physical Exam





- Summary


Physical Exam Summary: 





: Externally and internally there is no bleeding from the vaginal area.


Appearance: Well appearing, no pain distress


Skin: warm, dry, reflects adequate perfusion


Head/face: normal


Eyes: EOMI, BETZY


ENT: normal


Neck: supple, non-tender


Respiratory: CTA, breath sounds present


Cardiovascular: RRR, pulses symmetrical  


Abdomen: non-tender, soft


Musculoskeletal: normal, strength/ROM intact


Neuro: normal, sensory motor intact, A&Ox3 





Triage Information Reviewed: Yes


Vital Signs On Initial Exam: 


 Initial Vitals











Temp Pulse Resp BP Pulse Ox


 


 98.3 F   86   19   232/106   96 


 


 02/25/19 12:14  02/25/19 12:14  02/25/19 12:14  02/25/19 12:14  02/25/19 12:14











Vital Signs Reviewed: Yes





Diagnostics





- Vital Signs


 Vital Signs











  Temp Pulse Resp BP Pulse Ox


 


 02/25/19 12:14  98.3 F  86  19  232/106  96














- Laboratory


Result Diagrams: 


 02/25/19 13:22





 02/25/19 13:22


Lab Statement: Any lab studies that have been ordered have been reviewed, and 

results considered in the medical decision making process.





GIGU Course/Dx





- Course


Course Of Treatment: Pt is a 72 y/o F presenting to the ED with a chief 

complaint of bleeding upon urination with associated RLQ pain. She has had 

scans done for her lower GI which turned out fine. Today, she saw bright red 

blood after urinating. Per lab work, the pt has a UTI and will be discharged 

home. She is agreeable with this plan.





- Diagnoses


Differential Diagnoses - Female: Urinary Tract Infection


Provider Diagnoses: 


 UTI (urinary tract infection)








Discharge





- Sign-Out/Discharge


Documenting (check all that apply): Patient Departure


Patient Received Moderate/Deep Sedation with Procedure: No





- Discharge Plan


Condition: Stable


Disposition: HOME


Prescriptions: 


Clotrimazole 1% TOPICAL (NF) [Lotrimin 1% TOPICAL (NF)] 1 applic TOPICAL BID #2 

tube


Sulfamethox/Trimethoprim DS* [Bactrim /160 TAB*] 1 tab PO BID #10 tab


Referrals: 


Chau Purcell MD [Primary Care Provider] - 


Additional Instructions: 


Please follow up with your primary care provider within the next three days.





Return to the emergency department with any new or worsening symptoms.





- Billing Disposition and Condition


Condition: STABLE


Disposition: Home





- Attestation Statements


Document Initiated by Scribe: Yes


Documenting Scribe: Summer Hannon


Provider For Whom Scribe is Documenting (Include Credential): Thierno Colon MD.


Scribe Attestation: 


Summer TORRES, barbaraed for Thierno Colon MD. on 02/25/19 at 1433. 


Scribe Documentation Reviewed: Yes


Provider Attestation: 


The documentation as recorded by the Summer lopez accurately 

reflects the service I personally performed and the decisions made by Thierno santos MD.


Status of Scribe Document: Viewed

## 2019-04-28 ENCOUNTER — HOSPITAL ENCOUNTER (EMERGENCY)
Dept: HOSPITAL 25 - ED | Age: 74
Discharge: HOME | End: 2019-04-28
Payer: MEDICARE

## 2019-04-28 VITALS — SYSTOLIC BLOOD PRESSURE: 177 MMHG | DIASTOLIC BLOOD PRESSURE: 88 MMHG

## 2019-04-28 DIAGNOSIS — Z88.8: ICD-10-CM

## 2019-04-28 DIAGNOSIS — E11.9: ICD-10-CM

## 2019-04-28 DIAGNOSIS — I25.10: ICD-10-CM

## 2019-04-28 DIAGNOSIS — Z88.5: ICD-10-CM

## 2019-04-28 DIAGNOSIS — R42: Primary | ICD-10-CM

## 2019-04-28 DIAGNOSIS — I10: ICD-10-CM

## 2019-04-28 DIAGNOSIS — Z88.3: ICD-10-CM

## 2019-04-28 DIAGNOSIS — Z85.828: ICD-10-CM

## 2019-04-28 DIAGNOSIS — R55: ICD-10-CM

## 2019-04-28 LAB
ALBUMIN SERPL BCG-MCNC: 3.8 G/DL (ref 3.2–5.2)
ALBUMIN/GLOB SERPL: 1.4 {RATIO} (ref 1–3)
ALP SERPL-CCNC: 75 U/L (ref 34–104)
ALT SERPL W P-5'-P-CCNC: 44 U/L (ref 7–52)
ANION GAP SERPL CALC-SCNC: 8 MMOL/L (ref 2–11)
APTT PPP: 31 SECONDS (ref 26–36.3)
AST SERPL-CCNC: 28 U/L (ref 13–39)
BASOPHILS # BLD AUTO: 0.1 10^3/UL (ref 0–0.2)
BUN SERPL-MCNC: 19 MG/DL (ref 6–24)
BUN/CREAT SERPL: 28.4 (ref 8–20)
CALCIUM SERPL-MCNC: 8.9 MG/DL (ref 8.6–10.3)
CHLORIDE SERPL-SCNC: 105 MMOL/L (ref 101–111)
EOSINOPHIL # BLD AUTO: 0.2 10^3/UL (ref 0–0.6)
GLOBULIN SER CALC-MCNC: 2.8 G/DL (ref 2–4)
GLUCOSE SERPL-MCNC: 123 MG/DL (ref 70–100)
HCO3 SERPL-SCNC: 25 MMOL/L (ref 22–32)
HCT VFR BLD AUTO: 40 % (ref 33–41)
HGB BLD-MCNC: 13.8 G/DL (ref 12–16)
INR PPP/BLD: 1 (ref 0.82–1.09)
LYMPHOCYTES # BLD AUTO: 3.1 10^3/UL (ref 1–4.8)
MCH RBC QN AUTO: 31 PG (ref 27–31)
MCHC RBC AUTO-ENTMCNC: 35 G/DL (ref 31–36)
MCV RBC AUTO: 89 FL (ref 80–97)
MONOCYTES # BLD AUTO: 1.1 10^3/UL (ref 0–0.8)
NEUTROPHILS # BLD AUTO: 4.3 10^3/UL (ref 1.5–7.7)
NRBC # BLD AUTO: 0 10^3/UL
NRBC BLD QL AUTO: 0.1
PLATELET # BLD AUTO: 204 10^3/UL (ref 150–450)
POTASSIUM SERPL-SCNC: 3.9 MMOL/L (ref 3.5–5)
PROT SERPL-MCNC: 6.6 G/DL (ref 6.4–8.9)
RBC # BLD AUTO: 4.47 10^6 /UL (ref 3.7–4.87)
SODIUM SERPL-SCNC: 138 MMOL/L (ref 135–145)
TROPONIN I SERPL-MCNC: 0 NG/ML (ref ?–0.04)
WBC # BLD AUTO: 8.8 10^3/UL (ref 3.5–10.8)

## 2019-04-28 PROCEDURE — 84484 ASSAY OF TROPONIN QUANT: CPT

## 2019-04-28 PROCEDURE — 85610 PROTHROMBIN TIME: CPT

## 2019-04-28 PROCEDURE — 36415 COLL VENOUS BLD VENIPUNCTURE: CPT

## 2019-04-28 PROCEDURE — 70450 CT HEAD/BRAIN W/O DYE: CPT

## 2019-04-28 PROCEDURE — 99283 EMERGENCY DEPT VISIT LOW MDM: CPT

## 2019-04-28 PROCEDURE — 83605 ASSAY OF LACTIC ACID: CPT

## 2019-04-28 PROCEDURE — 80053 COMPREHEN METABOLIC PANEL: CPT

## 2019-04-28 PROCEDURE — 85730 THROMBOPLASTIN TIME PARTIAL: CPT

## 2019-04-28 PROCEDURE — 71045 X-RAY EXAM CHEST 1 VIEW: CPT

## 2019-04-28 PROCEDURE — 81003 URINALYSIS AUTO W/O SCOPE: CPT

## 2019-04-28 PROCEDURE — 93005 ELECTROCARDIOGRAM TRACING: CPT

## 2019-04-28 PROCEDURE — 85025 COMPLETE CBC W/AUTO DIFF WBC: CPT

## 2019-04-28 NOTE — ED
Neurological HPI





- HPI Summary


HPI Summary: 


This patient is a 74 year old F presenting to ED with a chief complaint of 

intermittent weakness since November 2018 after she got her tooth taken out. 

She reports her sx worsened this morning while walking to the bathroom where 

she had a near-syncopal episode. Patient describes the CC as legs started to 

fold up, starting to be pushed forward. Patient did not have head trauma and 

did not lose consciousness. Patient reports right abdominal pain described as 

cramping and dizziness. The patient reports that she lives alone. PMHx of DM, 

HTN, cardiac, disease, erosive esophagitis, basal cell carcinoma shoulder area, 

melanoma on face, chronic left jaw infection, hemorrhoids, appendicitis, and 

diverticulitis. PSHx of impacted wisdom teeth, cyst removed from ovary area, 

appendectomy, total hysterectomy, basal cell removal from shoulder and facial-

near nose, and cardiac cath w/o stents. She does not use alcohol, smoke tobacco

, or use substances.





- History of Current Complaint


Chief Complaint: EDWeakness


Stated Complaint: NEAR SYNCOPE PER EMS


Time Seen by Provider: 04/28/19 03:51


Hx Obtained From: Patient


Onset/Duration: Started weeks ago - November 2018, Still Present, Worse Since - 

This morning


Timing: Intermittent Episodes Lasting:


Current Severity: None


Pain Intensity: 0


Pain Scale Used: 0-10 Numeric


Character: Weak, Other: - near-syncope, denies head injury and LOC


Aggravating: Nothing


Alleviating: Nothing


Associated Signs and Symptoms: Positive: Weakness, Dizziness.  Negative: Loss 

of Consciousness, Trauma: Recent





- Allergy/Home Medications


Allergies/Adverse Reactions: 


 Allergies











Allergy/AdvReac Type Severity Reaction Status Date / Time


 


celecoxib Allergy  Hives Verified 02/25/19 12:22


 


cephalexin [From Keflex] Allergy  Unknown Verified 02/25/19 12:22





   Reaction  





   Details  


 


cimetidine Allergy  Unknown Verified 02/25/19 12:22





   Reaction  





   Details  


 


ciprofloxacin Allergy  Joint Pain Verified 02/25/19 12:22


 


erythromycin base Allergy  Rash Verified 02/25/19 12:22


 


hydroxyzine Allergy  Headache Verified 02/25/19 12:22


 


omeprazole Allergy  Unknown Verified 02/25/19 12:22





   Reaction  





   Details  


 


trazodone Allergy  Unknown Verified 02/25/19 12:22





   Reaction  





   Details  














PMH/Surg Hx/FS Hx/Imm Hx


Endocrine/Hematology History: Reports: Hx Diabetes


   Denies: Hx Anticoagulant Therapy


Cardiovascular History: Reports: Hx Coronary Artery Disease, Hx Hypertension


   Denies: Hx Pacemaker/ICD


GI History: Reports: Other GI Disorders - erosive esophagitis 


 History: Reports: Other  Problems/Disorders - recent treatment for "

vaginal yeast infection" No vaginal bleeding 


Musculoskeletal History: 


   Denies: Hx Osteoporosis


Sensory History: 


   Denies: Hx Hearing Aid


Psychiatric History: 


   Denies: Hx Panic Disorder, Hx of Violent Episodes Against Others





- Cancer History


Cancer Type, Location and Year: BASAL CELL CARCINOMA- SHOULDER AREA-MANY YRS 

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


Hx Chemotherapy: No


Hx Radiation Therapy: No





- Surgical History


Surgery Procedure, Year, and Place: IMPACTED WISDON TEETH; CYST REMOVED FROM 

OVARY AREA; APPENDECTOMY; LAPROSCOPY & THEN TOTAL HYSTERECTOMY; TONSILS; 

ENDOMETRIOSIS; PHILLIP GREAT TOENAILS REMOVED; BASAL CELL REMOVAL FROM SHOULDER &.  

FACIAL-NEAR NOSE; CATARACT; CARDIAC CATH W/O STENTS





- Immunization History


Date of Tetanus Vaccine: Unk


Date of Influenza Vaccine: 2012; avoids due to getting flu-like symptoms after 

vaccination


Infectious Disease History: No


Infectious Disease History: 


   Denies: Traveled Outside the [place] in Last 30 Days





- Family History


Known Family History: Positive: Cardiac Disease


   Negative: Diabetes





- Social History


Alcohol Use: None


Hx Substance Use: No


Substance Use Type: Reports: None


Hx Tobacco Use: No


Smoking Status (MU): Never Smoked Tobacco





Review of Systems


Positive: Abdominal Pain - cramping


Musculoskeletal: Other - No head trauma


Neurological: Other - near-syncope; denies LOC


Positive: Weakness - Legs started to fold up, falling forward


All Other Systems Reviewed And Are Negative: Yes





Physical Exam





- Summary


Physical Exam Summary: 


Appearance: Well appearing, no pain distress


Skin: warm, dry, reflects adequate perfusion


Head/face: normal


Eyes: EOMI, BETZY


ENT: normal


Neck: supple, non-tender


Respiratory: CTA, breath sounds present


Cardiovascular: RRR, pulses symmetrical 


Abdomen: non-tender, soft


Musculoskeletal: normal, strength/ROM intact


Neuro: normal, sensory motor intact, A&Ox3


GCS: 15


Triage Information Reviewed: Yes


Vital Signs On Initial Exam: 


 Initial Vitals











Pulse BP Pulse Ox


 


 76   203/94   97 


 


 04/28/19 03:54  04/28/19 03:54  04/28/19 03:54











Vital Signs Reviewed: Yes





Diagnostics





- Vital Signs


 Vital Signs











  Temp Pulse Resp BP Pulse Ox


 


 04/28/19 04:00  98.4 F  76  20  203/94  95


 


 04/28/19 03:55   76    97


 


 04/28/19 03:54   76   203/94  97














- Laboratory


Result Diagrams: 


 04/28/19 04:38





 04/28/19 04:38


Lab Statement: Any lab studies that have been ordered have been reviewed, and 

results considered in the medical decision making process.





- Radiology


  ** CXR


Radiology Interpretation Completed By: ED Physician


Summary of Radiographic Findings: No acute processes.  Pending official 

radiology interpretation.





- CT


  ** CT Brain


CT Interpretation Completed By: Radiologist


Summary of CT Findings: No acute intracranial findings.  Dr. Colon has 

reviewed this radiology report.





- EKG


  ** 0514


Cardiac Rate: NL - 64 BPM


EKG Rhythm: Sinus Rhythm


Summary of EKG Findings: No acute changes.





Course/Dx





- Course


Assessment/Plan: This patient is a 74 year old F presenting to ED with a chief 

complaint of intermittent weakness since November 2018 after she got her tooth 

taken out. Blood work and UA obtained. EKG reveals NSR at 64 BPM and no acute 

changes. CXR reveals no acute processes. CT brain reveals no acute intracranial 

findings. Patient will be discharged home with dx of dizziness and near 

syncope. Patient understands and agrees with this plan.





- Differential Dx


Differential Diagnoses Neuro: Positive: Dysrhythmia, Intracranial Bleed, 

Vasovagal Reaction, Other - Dizziness, near syncope





- Diagnoses


Provider Diagnoses: 


 Dizziness, Near syncope








Discharge





- Sign-Out/Discharge


Documenting (check all that apply): Patient Departure - Discharge


Patient Received Moderate/Deep Sedation with Procedure: No





- Discharge Plan


Condition: Stable


Disposition: HOME


Patient Education Materials:  Near Syncope (ED), Dizziness (ED)


Referrals: 


Chau Purcell MD [Primary Care Provider] - 3 Days


Additional Instructions: 


Follow up with your primary care provider in 3 days.


RETURN TO EMERGENCY ROOM IF CHANGING OR WORSENING SYMPTOMS.





- Billing Disposition and Condition


Condition: STABLE


Disposition: Home





- Attestation Statements


Document Initiated by Scribe: Yes


Documenting Scribe: Callum Camarena


Provider For Whom Chad is Documenting (Include Credential): Thierno Colon MD


Scribe Attestation: 


Callum TORRES, scribed for Thierno Colon MD on 04/28/19 at 0603. 


Scribe Documentation Reviewed: Yes


Provider Attestation: 


The documentation as recorded by the Callum lopez accurately reflects the 

service I personally performed and the decisions made by me, Thierno Colon MD


Status of Scribe Document: Viewed

## 2019-09-28 ENCOUNTER — HOSPITAL ENCOUNTER (EMERGENCY)
Dept: HOSPITAL 25 - ED | Age: 74
Discharge: HOME | End: 2019-09-28
Payer: MEDICARE

## 2019-09-28 VITALS — DIASTOLIC BLOOD PRESSURE: 91 MMHG | SYSTOLIC BLOOD PRESSURE: 175 MMHG

## 2019-09-28 DIAGNOSIS — I25.10: ICD-10-CM

## 2019-09-28 DIAGNOSIS — Z90.710: ICD-10-CM

## 2019-09-28 DIAGNOSIS — Z85.820: ICD-10-CM

## 2019-09-28 DIAGNOSIS — R10.9: Primary | ICD-10-CM

## 2019-09-28 DIAGNOSIS — Z79.899: ICD-10-CM

## 2019-09-28 DIAGNOSIS — Z88.8: ICD-10-CM

## 2019-09-28 DIAGNOSIS — Z85.89: ICD-10-CM

## 2019-09-28 DIAGNOSIS — I10: ICD-10-CM

## 2019-09-28 DIAGNOSIS — Z88.1: ICD-10-CM

## 2019-09-28 DIAGNOSIS — N28.1: ICD-10-CM

## 2019-09-28 DIAGNOSIS — E11.9: ICD-10-CM

## 2019-09-28 LAB
ALBUMIN SERPL BCG-MCNC: 4.2 G/DL (ref 3.2–5.2)
ALBUMIN/GLOB SERPL: 1.4 {RATIO} (ref 1–3)
ALP SERPL-CCNC: 93 U/L (ref 34–104)
ALT SERPL W P-5'-P-CCNC: 43 U/L (ref 7–52)
ANION GAP SERPL CALC-SCNC: 7 MMOL/L (ref 2–11)
AST SERPL-CCNC: 28 U/L (ref 13–39)
BASOPHILS # BLD AUTO: 0.1 10^3/UL (ref 0–0.2)
BUN SERPL-MCNC: 20 MG/DL (ref 6–24)
BUN/CREAT SERPL: 28.6 (ref 8–20)
CALCIUM SERPL-MCNC: 9.4 MG/DL (ref 8.6–10.3)
CHLORIDE SERPL-SCNC: 104 MMOL/L (ref 101–111)
EOSINOPHIL # BLD AUTO: 0.2 10^3/UL (ref 0–0.6)
GLOBULIN SER CALC-MCNC: 2.9 G/DL (ref 2–4)
GLUCOSE SERPL-MCNC: 102 MG/DL (ref 70–100)
HCO3 SERPL-SCNC: 25 MMOL/L (ref 22–32)
HCT VFR BLD AUTO: 41 % (ref 35–47)
HGB BLD-MCNC: 14.4 G/DL (ref 12–16)
LYMPHOCYTES # BLD AUTO: 3.1 10^3/UL (ref 1–4.8)
MCH RBC QN AUTO: 31 PG (ref 27–31)
MCHC RBC AUTO-ENTMCNC: 35 G/DL (ref 31–36)
MCV RBC AUTO: 90 FL (ref 80–97)
MONOCYTES # BLD AUTO: 1.1 10^3/UL (ref 0–0.8)
NEUTROPHILS # BLD AUTO: 5.9 10^3/UL (ref 1.5–7.7)
NRBC # BLD AUTO: 0 10^3/UL
NRBC BLD QL AUTO: 0
PLATELET # BLD AUTO: 179 10^3/UL (ref 150–450)
POTASSIUM SERPL-SCNC: 4 MMOL/L (ref 3.5–5)
PROT SERPL-MCNC: 7.1 G/DL (ref 6.4–8.9)
RBC # BLD AUTO: 4.59 10^6 /UL (ref 3.7–4.87)
SODIUM SERPL-SCNC: 136 MMOL/L (ref 135–145)
WBC # BLD AUTO: 10.4 10^3/UL (ref 3.5–10.8)

## 2019-09-28 PROCEDURE — 74177 CT ABD & PELVIS W/CONTRAST: CPT

## 2019-09-28 PROCEDURE — 99283 EMERGENCY DEPT VISIT LOW MDM: CPT

## 2019-09-28 PROCEDURE — 81003 URINALYSIS AUTO W/O SCOPE: CPT

## 2019-09-28 PROCEDURE — 36415 COLL VENOUS BLD VENIPUNCTURE: CPT

## 2019-09-28 PROCEDURE — 96375 TX/PRO/DX INJ NEW DRUG ADDON: CPT

## 2019-09-28 PROCEDURE — 85025 COMPLETE CBC W/AUTO DIFF WBC: CPT

## 2019-09-28 PROCEDURE — 83690 ASSAY OF LIPASE: CPT

## 2019-09-28 PROCEDURE — 96374 THER/PROPH/DIAG INJ IV PUSH: CPT

## 2019-09-28 PROCEDURE — 83605 ASSAY OF LACTIC ACID: CPT

## 2019-09-28 PROCEDURE — 96361 HYDRATE IV INFUSION ADD-ON: CPT

## 2019-09-28 PROCEDURE — 80307 DRUG TEST PRSMV CHEM ANLYZR: CPT

## 2019-09-28 PROCEDURE — 86140 C-REACTIVE PROTEIN: CPT

## 2019-09-28 PROCEDURE — 80053 COMPREHEN METABOLIC PANEL: CPT

## 2019-09-28 NOTE — ED
Progress





- Progress Note


Progress Note: 





This pt was signed out from Dr. Byrnes to Dr. Chao at shift change on 09/28/19 

at 0700 pending CT abdomen/pelvis.





CT abdomen/pelvis, as read by radiologist


IMPRESSION:


The CT exam does not have an anatomic explanation for the patient's right upper 

quadrant pain. No calcified stone within the gallbladder. No gallbladder wall 

thickening or pericholecystic fluid. No distension of the pancreatic duct. No 

focal lesion within the liver. The right kidney is smaller than the left kidney 

with some thinning of the renal cortex. This may be secondary to a prior 

inflammatory or infectious process. No hydronephrosis or nephrolithiasis. 

Complex cyst located in the right kidney in which the density is approximately 

54 Hounsfield units. This represents a Bosniak 2F cyst because of the density. 

Recommend ultrasound evaluation to determine whether this is cystic or solid. 

This was seen on the prior CT study of 02/17/2019 and has not changed in size 

or appearance. This could represent a hemorrhagic cyst.


Dr. Chao has reviewed this report.





Course/Dx





- Course


Course Of Treatment: Ms. Osborne was pending CT scan when I came in in the 

morning.  After CT she was evaluated and had some mild diffuse tenderness.  CT 

scan was unremarkable and I discussed likely reasons for returning to the 

emergency department.  I recommended follow-up with her PCP for further 

evaluation and gave her couple days of pain medication which she requested.





- Diagnoses


Provider Diagnoses: 


 Abdominal pain








Discharge ED





- Sign-Out/Discharge


Documenting (check all that apply): Patient Departure - Discharge home, 

Receiving Sign-Out


Receiving patient FROM: Rodney Byrnes


Patient Received Moderate/Deep Sedation with Procedure: No





- Discharge Plan


Condition: Stable


Disposition: HOME


Prescriptions: 


HYDROcodone/ACETAMIN 5-325 MG* [Norco 5-325 TAB*] 1 tab PO Q6H PRN #20 tab MDD 4


 PRN Reason: Pain


Patient Education Materials:  Abdominal Pain (ED)


Referrals: 


Chau Purcell MD [Primary Care Provider] - 


Additional Instructions: 


Follow up with your primary care provider, Dr. Purcell, in 2-3 days.





RETURN TO THE ED FOR ANY WORSENING OR NEW SYMPTOMS.





- Billing Disposition and Condition


Condition: STABLE


Disposition: Home





- Attestation Statements


Document Initiated by Scribe: Yes


Documenting Scribe: Kirstin Todd


Provider For Whom Scribe is Documenting (Include Credential): Rodney Chao MD


Scribe Attestation: 


I, Kirstin Todd, scribed for Rodney Chao MD on 09/28/19 at 1521. 


Scribe Documentation Reviewed: Yes


Provider Attestation: 


The documentation as recorded by the scribe, Kirstin Todd accurately reflects 

the service I personally performed and the decisions made by me, Rodney Chao MD


Status of Scribe Document: Viewed

## 2019-09-28 NOTE — ED
Abdominal Pain/Female





- HPI Summary


HPI Summary: 


This patient is a 74 year old female presenting to [facility] with a chief complaint 

of waxing lower abdominal pain. She states the right side hurts more than left. 

She states the pain radiates to her both flanks, the right side more than left. 

She rates her pain 10/10 in severity. She reports diarrhea. 





- History of Current Complaint


Chief Complaint: EDAbdPain


Stated Complaint: ABD PAIN PER PT


Time Seen by Provider: 09/28/19 04:57


Hx Obtained From: Patient


Pain Intensity: 10


Pain Scale Used: 0-10 Numeric


Location: Discrete At: RLQ, Discrete At: LLQ, Flank


Allergies/Adverse Reactions: 


 Allergies











Allergy/AdvReac Type Severity Reaction Status Date / Time


 


celecoxib Allergy  Hives Verified 09/28/19 04:25


 


cephalexin [From Keflex] Allergy  Unknown Verified 09/28/19 04:25





   Reaction  





   Details  


 


cimetidine Allergy  Unknown Verified 09/28/19 04:25





   Reaction  





   Details  


 


ciprofloxacin Allergy  Joint Pain Verified 09/28/19 04:25


 


erythromycin base Allergy  Rash Verified 09/28/19 04:25


 


hydroxyzine Allergy  Headache Verified 09/28/19 04:25


 


omeprazole Allergy  Unknown Verified 09/28/19 04:25





   Reaction  





   Details  


 


trazodone Allergy  Unknown Verified 09/28/19 04:25





   Reaction  





   Details  











Home Medications: 


 Home Medications





NIFEdipine [Nifedipine ER] 1 tab PO DAILY 09/28/19 [History Confirmed 09/28/19]











PMH/Surg Hx/FS Hx/Imm Hx


Endocrine/Hematology History: Reports: Hx Diabetes


   Denies: Hx Anticoagulant Therapy


Cardiovascular History: Reports: Hx Coronary Artery Disease, Hx Hypertension


   Denies: Hx Pacemaker/ICD


GI History: Reports: Other GI Disorders - erosive esophagitis 


 History: Reports: Other  Problems/Disorders - recent treatment for "

vaginal yeast infection" No vaginal bleeding 


Musculoskeletal History: 


   Denies: Hx Osteoporosis


Sensory History: 


   Denies: Hx Hearing Aid


Psychiatric History: 


   Denies: Hx Panic Disorder, Hx of Violent Episodes Against Others





- Cancer History


Cancer Type, Location and Year: BASAL CELL CARCINOMA- SHOULDER AREA-MANY YRS 

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


Hx Chemotherapy: No


Hx Radiation Therapy: No





- Surgical History


Surgery Procedure, Year, and Place: IMPACTED WISDON TEETH; CYST REMOVED FROM 

OVARY AREA; APPENDECTOMY; LAPROSCOPY & THEN TOTAL HYSTERECTOMY; TONSILS; 

ENDOMETRIOSIS; PHILLIP GREAT TOENAILS REMOVED; BASAL CELL REMOVAL FROM SHOULDER &.  

FACIAL-NEAR NOSE; CATARACT; CARDIAC CATH W/O STENTS





- Immunization History


Date of Tetanus Vaccine: Unk


Date of Influenza Vaccine: 2012; avoids due to getting flu-like symptoms after 

vaccination


Infectious Disease History: No


Infectious Disease History: 


   Denies: Traveled Outside the [place] in Last 30 Days





- Family History


Known Family History: Positive: Cardiac Disease


   Negative: Diabetes





- Social History


Alcohol Use: None


Hx Substance Use: No


Substance Use Type: Reports: None


Hx Tobacco Use: No


Smoking Status (MU): Never Smoked Tobacco





Review of Systems


Positive: Abdominal Pain, Diarrhea


Positive: flank pain


All Other Systems Reviewed And Are Negative: Yes





Physical Exam





- Summary


Physical Exam Summary: 





Appearance: Well-appearing, Well-nourished, lying in bed comfortably


Skin: Warm, dry, no obvious rash


Eyes: sclera anicteric, no conjunctival pallor


ENT: mucous membranes moist, pharynx appears normal


Neck: Supple, nontender


Respiratory: Clear to auscultation, no signs of respiratory distress


Cardiovascular: Normal S1, S2. No murmurs. Normal distal pulses in tibial and 

radial bilaterally.


Abdomen: Soft, nontender, normal active bowel sounds present


Musculoskeletal: Normal, Strength/ROM Intact


Neurological: A&Ox3, awake and alert, mentation is normal, speech is fluent and 

appropriate


Psychiatric: affect is normal, does not appear anxious or depressed





Triage Information Reviewed: Yes


Vital Signs On Initial Exam: 


 Initial Vitals











Temp Pulse Resp BP Pulse Ox


 


 98.1 F   85   16   226/120   97 


 


 09/28/19 04:19  09/28/19 04:19  09/28/19 04:19  09/28/19 04:19  09/28/19 04:19











Vital Signs Reviewed: Yes





Procedures





- Sedation


Patient Received Moderate/Deep Sedation with Procedure: No





Diagnostics





- Vital Signs


 Vital Signs











  Temp Pulse Resp BP Pulse Ox


 


 09/28/19 04:19  98.1 F  85  16  226/120  97














- Laboratory


Result Diagrams: 


 09/28/19 05:12





 09/28/19 05:12


Lab Statement: Any lab studies that have been ordered have been reviewed, and 

results considered in the medical decision making process.





Abdominal Pain Fem Course/Dx





- Course


Course Of Treatment: This patient is a 74 year old female presenting to [facility] 

with a chief complaint of lower abdominal pain. The patient will be signed out 

to Dr. Chao at 0700 pending CT Abd/Pelvis reading.





- Diagnoses


Provider Diagnoses: 


 Abdominal pain








Discharge ED





- Sign-Out/Discharge


Documenting (check all that apply): Sign-Out Patient


Signing out patient TO: Rodney Chao





- Discharge Plan


Condition: Stable


Disposition: HOME


Prescriptions: 


HYDROcodone/ACETAMIN 5-325 MG* [Norco 5-325 TAB*] 1 tab PO Q6H PRN #20 tab MDD 4


 PRN Reason: Pain


Patient Education Materials:  Abdominal Pain (ED)


Referrals: 


Chau Purcell MD [Primary Care Provider] - 


Additional Instructions: 


Follow up with your primary care provider, Dr. Purcell, in 2-3 days.





RETURN TO THE ED FOR ANY WORSENING OR NEW SYMPTOMS.





- Billing Disposition and Condition


Condition: STABLE


Disposition: Home





- Attestation Statements


Document Initiated by Chad: Yes


Documenting Maralibe: Rj Mao


Provider For Whom Chad is Documenting (Include Credential): Rodney Byrnes MD


Scribe Attestation: 


Rj TORRES scribed for Rodney Byrnes MD on 10/04/19 at 0423. 


Scribe Documentation Reviewed: Yes


Provider Attestation: 


The documentation as recorded by the Rj lopez accurately 

reflects the service I personally performed and the decisions made by me, 

Rodney Byrnes MD


Status of Scribe Document: Viewed

## 2020-02-21 NOTE — HP
CC:  Dr. Purcell

 

HISTORY AND PHYSICAL:

 

DATE OF PLANNED ADMISSION AND SURGERY:  02/28/20

 

HISTORY OF PRESENT ILLNESS:  Ms. Osborne is a 74-year-old white female, who is 
admitted with history of urinary tract infections, persistent suspicious 
bladder lesions for cystoscopy and bladder biopsies.

 

Ms. Osborne was noted by Dr. Purcell, her primary care physician, to have 
persistent microscopic hematuria.  The patient had noted pinkish discoloration 
on the tissue when she wiped after  voiding.  The patient has voiding symptoms 
consistent of nocturia about almost every hour, day frequency about 4 to 5 
times.  She reports having slow urinary stream and feeling at times of 
incomplete bladder emptying.

 

The patient had a CT of the abdomen and pelvis with intravenous contrast 1 year 
ago.  The study showed small bilateral renal cysts, but there were no other 
renal abnormalities, in particular no abnormal filling defects in the 
collecting systems or in the ureters and no renal masses and no hydronephrosis.

 

Past  history is relevant for urethral stenosis, for which I had seen her 
back about 15 years ago.  At that time, she had urethral dilation and her 
symptoms improved.

 

The patient was worked up in my office for the microscopic hematuria and 
underwent a cystoscopy about 2 months ago.  At that time, the study showed 
several hyperemic areas in the anterior bladder wall.  The appearance of the 
lesions were either carcinoma in situ or cystitis changes.  The patient then 
had a positive urine culture growing E. coli sensitive to Bactrim and she 
received a course of Bactrim. 

She had a follow-up cystoscopy to check on the bladder lesions that were noted 
and the cystoscopy continued to show 2 deeply hyperemic flat lesions in the 
right anterior bladder wall.  The lesions again were suspicious for carcinoma 
in situ versus acute cystitis changes.  The rest of the bladder wall showed 
minimal degree of hyperemia, but no similar lesions.  Her urine cytologies were 
negative.

 

The patient is now admitted for cystoscopy and bladder biopsies.

 

Past  history is otherwise negative.

 

PAST MEDICAL HISTORY AND SYSTEM REVIEW:  The patient has essential 
hypertension.  

She is overweight.  

She has history of anxiety and depression and panic disorder.  

She has GERD, on treatment.

 

MEDICATIONS:  She is maintained on:

1.  Calcium supplements.

2.  Vitamin D.

3.  Estradiol 10 mcg vaginal tablet.

4.  Pepcid 40 mg daily.

5.  Folic acid 400 mcg daily.

6.  Niacin 500 mg daily.

7.  For her hypertension, she is on Procardia 30 mg daily.

8.  She takes trazodone for the anxiety and depression.

 

ALLERGIES:  The patient reports being allergic to CIPRO, which gives her CNS 
reactions.  

She reports being allergic to KEFLEX, but she does not know the reaction.  

She reports that ERYTHROMYCIN and DOXYCYCLINE give her a GI upset.  

She is also allergic to CELEBREX.

 

FAMILY HISTORY:  Ischemic heart disease.  Negative otherwise.

 

SOCIAL HISTORY:  She is a nonsmoker.  She denies the alcohol and recreational 
drug intake.

 

MENTAL HEALTH:  Relevant for anxiety and for depression.

 

                               PHYSICAL EXAMINATION

 

GENERAL:  She is an overweight white female, who looks her age.

 

VITAL SIGNS:  Blood pressure 130/80, pulse of 74.

 

LUNGS:  Clear.

 

HEART:  Regular and rhythmic.  No murmurs.

 

ABDOMEN:  Soft.  No masses, no tenderness, and no CVA tenderness.

 

PELVIC:  Examination done at the time of the cystoscopy showed no pelvic masses.

 

 IMPRESSION:  Microscopic hematuria, history of urinary tract infections and 
persistent suspicious hyperemic lesions involving the right anterior bladder 
wall with normal CT of the abdomen and pelvis with IV contrast 1 year ago.

 

PLAN:  Cystoscopy and biopsy of the bladder lesions.  I discussed the above 
plans with the patient.  All her questions were answered.

 

 

 

[id number] #: [id number]

KATIE

## 2020-02-28 ENCOUNTER — HOSPITAL ENCOUNTER (OUTPATIENT)
Dept: HOSPITAL 25 - OR | Age: 75
Discharge: HOME | End: 2020-02-28
Attending: UROLOGY
Payer: MEDICARE

## 2020-02-28 VITALS — SYSTOLIC BLOOD PRESSURE: 166 MMHG | DIASTOLIC BLOOD PRESSURE: 82 MMHG

## 2020-02-28 DIAGNOSIS — K21.9: ICD-10-CM

## 2020-02-28 DIAGNOSIS — N32.89: Primary | ICD-10-CM

## 2020-02-28 DIAGNOSIS — F41.0: ICD-10-CM

## 2020-02-28 DIAGNOSIS — F41.8: ICD-10-CM

## 2020-02-28 DIAGNOSIS — R31.29: ICD-10-CM

## 2020-02-28 DIAGNOSIS — Z87.440: ICD-10-CM

## 2020-02-28 DIAGNOSIS — E66.9: ICD-10-CM

## 2020-02-28 PROCEDURE — 88305 TISSUE EXAM BY PATHOLOGIST: CPT

## 2020-02-28 NOTE — OP
CC:  Dr. Purcell

 

OPERATIVE REPORT:

 

DATE OF OPERATION:  02/28/20

 

DATE OF BIRTH:  [birth year]

 

SURGEON:  Jeffy Gibson MD

 

ANESTHESIOLOGIST:  Dr. Bearden.

 

ANESTHESIA:  IV sedation with MAC.

 

PRE-OP DIAGNOSIS:  Suspicious bladder lesions, anterior lateral wall.

 

POST-OP DIAGNOSES:

1.  Suspicious bladder lesions, right anterior bladder wall.

2.  Pending pathology.

 

OPERATIVE PROCEDURE:

1.  Cystoscopy.

2.  Excisional biopsies and fulguration of 2 anterior bladder wall lesions.

 

INDICATIONS FOR PROCEDURE:  Ms. Osborne is a 74-year-old white female who was 
worked up because of persistent microscopic hematuria and who had a normal CT 
urogram 1 year ago.  Office Cystoscopy showed 2 flat hyperemic lesions, each 
measuring about 1 cm, located in the right anterior lateral bladder wall.  The 
patient was treated with a course of antibiotics and on followup cystoscopy, 
the lesions persisted.

 

Because of this finding, the patient is admitted for excisional biopsies and 
fulguration.

 

PATHOLOGY AT CYSTOSCOPY:  There were changes of squamous metaplasia and 
cystitis glandularis involving the posterior bladder neck and trigone.  The 
ureteral orifices looked normal.  There were mild-to-moderate bladder 
trabeculations.  There were 2 flat hyperemic lesions located in the right 
anterior bladder wall.  Each lesion measured about 1 cm in size.  The 
appearance was that of either carcinoma in situ or inflammatory changes of the 
bladder.  The rest of the bladder wall looked normal.  There were no other 
lesions seen.  No calculi or diverticula were noted.

 

DESCRIPTION OF PROCEDURE:  With the patient in the dorsal lithotomy position 
and under intravenous sedation and anesthesia monitoring, the patient was 
prepped and draped for a cystoscopy.  Cystoscopy was performed.  The bladder 
was carefully inspected and the above findings were noted.

 

Using the rigid biopsy forceps, the two above described lesions were biopsied. 
Care was taken to include muscle, but not to cause a bladder perforation.  The 
sites of the biopsies were thoroughly fulgurated with Bugbee electrode 
achieving very good hemostasis.

 

A final inspection showed good hemostasis, no residual lesions seen.  There was 
no evidence of bladder perforation.  

The cystoscope was removed.  A 16-Swedish [place] catheter was passed inside the 
bladder and the balloon inflated with 10 cc of water.

 

The patient tolerated the procedure well and left the operating room in good 
condition.

 

The plan is to observe the patient in the PACU.  If the urine remains clear and 
she is doing fine, the [place] catheter will be removed.  

Instructions were given for followup care.  The decision on additional 
treatment will depend upon the pathology report.

 

 [id number] #: [id number]

KATIE